# Patient Record
(demographics unavailable — no encounter records)

---

## 2025-02-03 NOTE — PHYSICAL EXAM
[de-identified] : W[facility] elderly female in NAD Left arm sling and left knee immobilizer in place Left shoulder: Flexion 0-90 Left knee :0-90 [de-identified] : Left shoulder Xrays 3v: displaced inferior scapular body fracture, callus noted. Left rib Xrays: multiple mildly displaced rib fractures noted Left tib fib Xrays reveal a partially healed proximal fibula shaft fracture Left ankle Xrays 3v are normal

## 2025-02-03 NOTE — DISCUSSION/SUMMARY
[de-identified] : The patient was advised to discontinue the sling and knee brace ROM of shoulder and knee are encouraged PT script provided Return in 4 weeks
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
## 2025-03-20 NOTE — ADDENDUM
[FreeTextEntry1] : I, Tiera Sanchez, wrote this note acting as a scribe for Dr. Phil Velarde on Mar 17, 2025.  All medical record entries made by the Scribe were at my, Dr. Phil Velarde MD., direction and personally dictated by me on 03/17/2025. I have personally reviewed the chart and agree that the record accurately reflects my personal performance of the history, physical exam, assessment and plan.

## 2025-03-20 NOTE — DISCUSSION/SUMMARY
[de-identified] : The underlying pathophysiology was reviewed with the patient. XR films were reviewed with the patient. Discussed at length the nature of the patient's condition.		  Range of motion and strengthening exercises were reviewed. NSAIDs as tolerated.		 Patient is to continue with physical therapy to work on ROM and strengthening of the shoulder and knee. Prescription was provided today.  All questions answered, understanding verbalized. Patient in agreement with plan of care. Patient may follow up with X-rays in 3 months.

## 2025-03-20 NOTE — DISCUSSION/SUMMARY
[de-identified] : The underlying pathophysiology was reviewed with the patient. XR films were reviewed with the patient. Discussed at length the nature of the patient's condition.		  Range of motion and strengthening exercises were reviewed. NSAIDs as tolerated.		 Patient is to continue with physical therapy to work on ROM and strengthening of the shoulder and knee. Prescription was provided today.  All questions answered, understanding verbalized. Patient in agreement with plan of care. Patient may follow up with X-rays in 3 months.

## 2025-03-20 NOTE — PHYSICAL EXAM
[de-identified] : [facility] elderly female in NAD Left shoulder: Flexion 0-90 Left knee :0-90 [de-identified] : Left shoulder Xrays 3v: displaced inferior scapular body fracture, now well healed.  Left rib Xrays 2 views: five mildly displaced rib fractures now well healed.  Left tib fib Xrays 2 views reveal a healed healed proximal fibula shaft fracture

## 2025-03-20 NOTE — PHYSICAL EXAM
[de-identified] : [facility] elderly female in NAD Left shoulder: Flexion 0-90 Left knee :0-90 [de-identified] : Left shoulder Xrays 3v: displaced inferior scapular body fracture, now well healed.  Left rib Xrays 2 views: five mildly displaced rib fractures now well healed.  Left tib fib Xrays 2 views reveal a healed healed proximal fibula shaft fracture
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
